# Patient Record
Sex: FEMALE | Race: WHITE | Employment: FULL TIME | URBAN - METROPOLITAN AREA
[De-identification: names, ages, dates, MRNs, and addresses within clinical notes are randomized per-mention and may not be internally consistent; named-entity substitution may affect disease eponyms.]

---

## 2017-05-16 ENCOUNTER — OFFICE VISIT (OUTPATIENT)
Dept: FAMILY MEDICINE CLINIC | Age: 37
End: 2017-05-16

## 2017-05-16 VITALS
WEIGHT: 172 LBS | DIASTOLIC BLOOD PRESSURE: 82 MMHG | RESPIRATION RATE: 18 BRPM | BODY MASS INDEX: 31.65 KG/M2 | SYSTOLIC BLOOD PRESSURE: 120 MMHG | HEIGHT: 62 IN | HEART RATE: 78 BPM | TEMPERATURE: 98.2 F | OXYGEN SATURATION: 100 %

## 2017-05-16 DIAGNOSIS — F41.9 ANXIETY AND DEPRESSION: Primary | ICD-10-CM

## 2017-05-16 DIAGNOSIS — F90.9 ATTENTION DEFICIT HYPERACTIVITY DISORDER (ADHD), UNSPECIFIED ADHD TYPE: ICD-10-CM

## 2017-05-16 DIAGNOSIS — Z30.41 ENCOUNTER FOR BIRTH CONTROL PILLS MAINTENANCE: ICD-10-CM

## 2017-05-16 DIAGNOSIS — F32.A ANXIETY AND DEPRESSION: Primary | ICD-10-CM

## 2017-05-16 DIAGNOSIS — N93.9 VAGINAL BLEEDING: ICD-10-CM

## 2017-05-16 RX ORDER — FLUOXETINE 10 MG/1
20 CAPSULE ORAL DAILY
COMMUNITY
End: 2017-10-04

## 2017-05-16 RX ORDER — NORTRIPTYLINE HYDROCHLORIDE 25 MG/1
CAPSULE ORAL
COMMUNITY
Start: 2017-05-03 | End: 2017-07-20

## 2017-05-16 NOTE — PROGRESS NOTES
Patient presents to clinic for routine follow up. Advance Directive:    1. Do you have an advance directive in place? Patient Reply:     2. If not, would you like material regarding how to put one in place? Patient Reply:      Coordination of Care:    1. Have you been to the ER, urgent care clinic since your last visit? Hospitalized since your last visit? No     2. Have you seen or consulted any other health care providers outside of the [address] [place] [address] since your last visit? Include any pap smears or colon screening. [address] [place] [address]     Depression Screening completed. Learning Assessment completed. Abuse Screening completed. Health Maintenance reviewed and discussed per provider.

## 2017-05-16 NOTE — MR AVS SNAPSHOT
Visit Information Date & Time Provider Department Dept. Phone Encounter #  
 5/16/2017  8:30 AM Enedelia Mak[address]  [id number] Follow-up Instructions Return as needed. Upcoming Health Maintenance Date Due DTaP/Tdap/Td series (1 - Tdap) 8/4/2001 INFLUENZA AGE 9 TO ADULT 8/1/2017 PAP AKA CERVICAL CYTOLOGY 7/27/2018 Allergies as of 5/16/2017  Review Complete On: 5/16/2017 By: Enedelia Mak MD  
  
 Severity Noted Reaction Type Reactions Pcn [Penicillins]  10/17/2016    Rash Current Immunizations  Never Reviewed No immunizations on file. Not reviewed this visit You Were Diagnosed With   
  
 Codes Comments Anxiety and depression    -  Primary ICD-10-CM: F41.9, F32.9 ICD-9-CM: 300.00, 311 Attention deficit hyperactivity disorder (ADHD), unspecified ADHD type     ICD-10-CM: F90.9 ICD-9-CM: 314.01 Vaginal bleeding     ICD-10-CM: N93.9 ICD-9-CM: 623.8 Encounter for birth control pills maintenance     ICD-10-CM: Z30.41 ICD-9-CM: V25.41 Vitals BP Pulse Temp Resp Height(growth percentile) Weight(growth percentile) 120/82 (BP 1 Location: Right arm, BP Patient Position: Sitting) 78 98.2 °F (36.8 °C) (Oral) 18 5' 2\" (1.575 m) 172 lb (78 kg) SpO2 BMI OB Status Smoking Status 100% 31.46 kg/m2 Unknown Never Smoker BMI and BSA Data Body Mass Index Body Surface Area  
 31.46 kg/m 2 1.85 m 2 Preferred Pharmacy Pharmacy Name Phone [facility] - [address], [address] [phone] Your Updated Medication List  
  
   
This list is accurate as of: 5/16/17  9:10 AM.  Always use your most recent med list.  
  
  
  
  
 * amphetamine-dextroamphetamine XR 20 mg XR capsule Commonly known as:  ADDERALL XR Take 1 Cap by mouth every morning. Max Daily Amount: 20 mg.  
  
 * dextroamphetamine-amphetamine 10 mg tablet Commonly known as:  ADDERALL Take 1 Tab by mouth daily. Max Daily Amount: 10 mg. At midday MONONESSA (28) 0.25-35 mg-mcg Tab Generic drug:  norgestimate-ethinyl estradiol Take 1 Tab by mouth daily. nortriptyline 25 mg capsule Commonly known as:  PAMELOR  
  
 PROzac 10 mg capsule Generic drug:  FLUoxetine Take  by mouth daily. * Notice: This list has 2 medication(s) that are the same as other medications prescribed for you. Read the directions carefully, and ask your doctor or other care provider to review them with you. Prescriptions Sent to Pharmacy Refills MONONESSA, 28, 0.25-35 mg-mcg tab 0 Sig: Take 1 Tab by mouth daily. Class: Normal  
 Pharmacy: [address] [place] [address], [address] Ph #: [phone] Route: Oral  
  
We Performed the Following REFERRAL TO GYNECOLOGY [REF30 Custom] Comments:  
 36/F s/p  2 months ago with continuous vaginal bleeding REFERRAL TO PSYCHIATRY [REF91 Custom] Comments:  
 36/F with ADHD, anxiety and depression for further evaluation and management. Follow-up Instructions Return as needed. To-Do List   
 2017 Imaging:  US TRANSVAGINAL Referral Information Referral ID Referred By Referred To  
  
 [id number] Dot Umaña Not Available Visits Status Start Date End Date 1 New Request 17 If your referral has a status of pending review or denied, additional information will be sent to support the outcome of this decision. Referral ID Referred By Referred To  
 [id number] Dot Umaña Not Available Visits Status Start Date End Date 1 New Request 17 If your referral has a status of pending review or denied, additional information will be sent to support the outcome of this decision. Introducing [facility]!    
 Rekha Wu introduces [employer] patient portal. Now you can access parts of your medical record, email your doctor's office, and request medication refills online. 1. In your internet browser, go to [email or web address] 2. Click on the First Time User? Click Here link in the Sign In box. You will see the New Member Sign Up page. 3. Enter your [employer] Access Code exactly as it appears below. You will not need to use this code after youve completed the sign-up process. If you do not sign up before the expiration date, you must request a new code. · [employer] Access Code: [email or web address] Expires: 8/14/2017  9:05 AM 
 
4. Enter the last four digits of your Social Security Number (xxxx) and Date of Birth (mm/dd/yyyy) as indicated and click Submit. You will be taken to the next sign-up page. 5. Create a [employer] ID. This will be your [employer] login ID and cannot be changed, so think of one that is secure and easy to remember. 6. Create a [employer] password. You can change your password at any time. 7. Enter your Password Reset Question and Answer. This can be used at a later time if you forget your password. 8. Enter your e-mail address. You will receive e-mail notification when new information is available in [address] 9. Click Sign Up. You can now view and download portions of your medical record. 10. Click the Download Summary menu link to download a portable copy of your medical information. If you have questions, please visit the Frequently Asked Questions section of the [employer] website. Remember, [employer] is NOT to be used for urgent needs. For medical emergencies, dial 911. Now available from your iPhone and Android! Please provide this summary of care documentation to your next provider. Your primary care clinician is listed as Ck Morel. If you have any questions after today's visit, please call [phone].

## 2017-05-16 NOTE — PROGRESS NOTES
History of Present Illness  Nidia Montoya is a 39 y.o. female who presents today for management of    Chief Complaint   Patient presents with    Behavioral Problem    Anxiety    Depression       Patient had an  2 months ago at [facility]. She had spotting for several weeks. She now has vaginal bleeding, moderate flow. She is now taking OCP. She is not sexually active currently. She sees a psychologist at the [place]. She was switched to Prozac and nortriptyline. Her adderall was discontinued but her school grades have all gone down. She does not see a therapist. She finished a group therapy CBT and states that it does more harm than good. Problem List  Patient Active Problem List    Diagnosis Date Noted    Attention deficit hyperactivity disorder (ADHD) 2017    Mild single current episode of major depressive disorder ([facility] [address] 2016       Past Medical History  Past Medical History:   Diagnosis Date    Anxiety     Depression     PTSD (post-traumatic stress disorder)     Sepsis ([facility] [address]         Surgical History  Past Surgical History:   Procedure Laterality Date    BREAST SURGERY PROCEDURE UNLISTED      implants    HX LAP CHOLECYSTECTOMY  2016    HX ORTHOPAEDIC  2005        Current Medications  Current Outpatient Prescriptions   Medication Sig    FLUoxetine (PROZAC) 10 mg capsule Take  by mouth daily.  MONONESSA, 28, 0.25-35 mg-mcg tab Take 1 Tab by mouth daily.  nortriptyline (PAMELOR) 25 mg capsule     amphetamine-dextroamphetamine XR (ADDERALL XR) 20 mg XR capsule Take 1 Cap by mouth every morning. Max Daily Amount: 20 mg.    dextroamphetamine-amphetamine (ADDERALL) 10 mg tablet Take 1 Tab by mouth daily. Max Daily Amount: 10 mg. At midday     No current facility-administered medications for this visit.         Allergies/Drug Reactions  Allergies   Allergen Reactions    Pcn [Penicillins] Rash        Family History  Family History   Problem Relation Age of Onset    Hypertension Mother     Hypertension Father     Parkinson's Disease Maternal Grandfather         Social History  Social History     Social History    Marital status: SINGLE     Spouse name: N/A    Number of children: N/A    Years of education: N/A     Occupational History    Not on file. Social History Main Topics    Smoking status: Never Smoker    Smokeless tobacco: Never Used    Alcohol use Yes      Comment: social    Drug use: No    Sexual activity: Not Currently     Partners: Male     Birth control/ protection: Pill     Other Topics Concern    Not on file     Social History Narrative       Review of Systems  General ROS: negative for - chills, fatigue or fever  Psychological ROS: positive for - anxiety, concentration difficulties and depression  negative for - hallucinations  Gastrointestinal ROS: no abdominal pain, change in bowel habits, or black or bloody stools  Genito-Urinary ROS: negative for - genital discharge or urinary frequency/urgency, positive for vaginal bleeding      Physical Exam  Vital signs:   Vitals:    17 0842   BP: 120/82   Pulse: 78   Resp: 18   Temp: 98.2 °F (36.8 °C)   TempSrc: Oral   SpO2: 100%   Weight: 172 lb (78 kg)   Height: 5' 2\" (1.575 m)       General: alert, oriented, not in distress  Abdomen: soft, non-distended, non-tender, normal bowel sounds, no organomegaly, no masses  Extremities: no focal deformities, no edema      Assessment/Plan:      1. Anxiety and depression  - continue current meds, will defer management to psych  - REFERRAL TO PSYCHIATRY    2. Attention deficit hyperactivity disorder (ADHD), unspecified ADHD type  - currently off meds, will defer management to psych  - REFERRAL TO PSYCHIATRY    3. Vaginal bleeding  - s/p  2 months ago  - [address]; Future  - referral to gynecology placed    Follow-up Disposition:  Return as needed.       I have discussed the diagnosis with the patient and the intended plan as seen in the above orders. The patient has received an after-visit summary and questions were answered concerning future plans. I have discussed medication side effects and warnings with the patient as well. I have reviewed the plan of care with the patient, accepted their input and they are in agreement with the treatment goals.        Enedelia Mak MD  May 16, 2017

## 2017-07-20 ENCOUNTER — HOSPITAL ENCOUNTER (OUTPATIENT)
Dept: LAB | Age: 37
Discharge: HOME | End: 2017-07-20
Payer: OTHER GOVERNMENT

## 2017-07-20 ENCOUNTER — OFFICE VISIT (OUTPATIENT)
Dept: OBGYN CLINIC | Age: 37
End: 2017-07-20

## 2017-07-20 VITALS
HEIGHT: 62 IN | BODY MASS INDEX: 33.49 KG/M2 | DIASTOLIC BLOOD PRESSURE: 107 MMHG | HEART RATE: 83 BPM | SYSTOLIC BLOOD PRESSURE: 147 MMHG | WEIGHT: 182 LBS

## 2017-07-20 DIAGNOSIS — N93.9 ABNORMAL UTERINE BLEEDING (AUB): ICD-10-CM

## 2017-07-20 DIAGNOSIS — Z01.419 WELL WOMAN EXAM WITH ROUTINE GYNECOLOGICAL EXAM: Primary | ICD-10-CM

## 2017-07-20 LAB
ERYTHROCYTE [DISTWIDTH] IN BLOOD BY AUTOMATED COUNT: 12.2 % (ref 11.6–14.5)
HCT VFR BLD AUTO: 41.7 % (ref 35–45)
HGB BLD-MCNC: 14.1 G/DL (ref 12–16)
MCH RBC QN AUTO: 31 PG (ref 24–34)
MCHC RBC AUTO-ENTMCNC: 33.8 G/DL (ref 31–37)
MCV RBC AUTO: 91.6 FL (ref 74–97)
PLATELET # BLD AUTO: 224 K/UL (ref 135–420)
PMV BLD AUTO: 10.8 FL (ref 9.2–11.8)
RBC # BLD AUTO: 4.55 M/UL (ref 4.2–5.3)
T4 FREE SERPL-MCNC: 1.2 NG/DL (ref 0.7–1.5)
TSH SERPL DL<=0.05 MIU/L-ACNC: 1.34 UIU/ML (ref 0.36–3.74)
WBC # BLD AUTO: 7.1 K/UL (ref 4.6–13.2)

## 2017-07-20 PROCEDURE — 88175 CYTOPATH C/V AUTO FLUID REDO: CPT | Performed by: OBSTETRICS & GYNECOLOGY

## 2017-07-20 PROCEDURE — 87491 CHLMYD TRACH DNA AMP PROBE: CPT | Performed by: OBSTETRICS & GYNECOLOGY

## 2017-07-20 PROCEDURE — 87624 HPV HI-RISK TYP POOLED RSLT: CPT | Performed by: OBSTETRICS & GYNECOLOGY

## 2017-07-20 RX ORDER — NORETHINDRONE ACETATE AND ETHINYL ESTRADIOL 1MG-20(21)
1 KIT ORAL DAILY
Qty: 1 PACKAGE | Refills: 11 | Status: SHIPPED | OUTPATIENT
Start: 2017-07-20 | End: 2017-07-20 | Stop reason: SDUPTHER

## 2017-07-20 RX ORDER — NORETHINDRONE ACETATE AND ETHINYL ESTRADIOL AND FERROUS FUMARATE 1MG-20(21)
KIT ORAL
Qty: 84 TAB | Refills: 11 | Status: SHIPPED | OUTPATIENT
Start: 2017-07-20 | End: 2017-10-04 | Stop reason: ALTCHOICE

## 2017-07-20 NOTE — PROGRESS NOTES
Subjective:   39 y.o. female for annual routine Pap and checkup. Patient's last menstrual period was 2017. Last pap smear:  2 years ago NILM  Menstrual history: usually regular, however, has been irregular since  in 2017 despite compliance with TERESA prescribed by PCP. Dysmenorrhea moderate  H/o STIs:  CT   Social History: single partner, contraception - OCP (Oral Contraceptive Pills). [unfilled]  OB History    Para Term  AB Living   5 3   1 3   SAB TAB Ectopic Molar Multiple Live Births     1         # Outcome Date GA Lbr Bryon/2nd Weight Sex Delivery Anes PTL Lv   5             4 Ectopic            3 Para            2 Para            1 Para                   Pertinent past medical hstory: no history of HTN, DVT, CAD, DM, liver disease, migraines or smoking. Patient Active Problem List   Diagnosis Code    Mild single current episode of major depressive disorder ([facility] [address] F32.0    Attention deficit hyperactivity disorder (ADHD) F90.9     Patient Active Problem List    Diagnosis Date Noted    Attention deficit hyperactivity disorder (ADHD) 2017    Mild single current episode of major depressive disorder ([facility] [address] 2016     Current Outpatient Prescriptions   Medication Sig Dispense Refill    norethindrone-ethinyl estradiol (JUNEL FE ) 1 mg-20 mcg (21)/75 mg (7) tab Take 1 Tab by mouth daily. 1 Package 11    FLUoxetine (PROZAC) 10 mg capsule Take 20 mg by mouth daily.        Allergies   Allergen Reactions    Pcn [Penicillins] Rash     Past Medical History:   Diagnosis Date    Anxiety     Depression     PTSD (post-traumatic stress disorder)     Sepsis ([facility] [address]      Past Surgical History:   Procedure Laterality Date    BREAST SURGERY PROCEDURE UNLISTED      implants    HX LAP CHOLECYSTECTOMY  2016    HX ORTHOPAEDIC  2005     Family History   Problem Relation Age of Onset    Hypertension Mother     Hypertension Father     Parkinson's Disease Maternal Grandfather      Social History   Substance Use Topics    Smoking status: Never Smoker    Smokeless tobacco: Never Used    Alcohol use Yes      Comment: social        ROS:  Feeling well. No dyspnea or chest pain on exertion. No abdominal pain, change in bowel habits, black or bloody stools. No urinary tract symptoms. GYN ROS: irregular menses, no pelvic pain or discharge, no breast pain or new or enlarging lumps on self exam. No neurological complaints. Objective:     Visit Vitals    BP (!) 147/107    Pulse 83    Ht 5' 2\" (1.575 m)    Wt 182 lb (82.6 kg)    LMP 07/04/2017    BMI 33.29 kg/m2     The patient appears well, alert, oriented x 3, in no distress. ENT normal.  Neck supple. No adenopathy or thyromegaly. BEKA. Lungs are clear, good air entry, no wheezes, rhonchi or rales. S1 and S2 normal, no murmurs, regular rate and rhythm. Abdomen soft without tenderness, guarding, mass or organomegaly. Extremities show no edema, normal peripheral pulses. Neurological is normal, no focal findings. BREAST EXAM: right breast normal without mass, skin or nipple changes or axillary nodes, left breast normal without mass, skin or nipple changes or axillary nodes    PELVIC EXAM: VULVA: normal appearing vulva with no masses, tenderness or lesions, VAGINA: normal appearing vagina with normal color and discharge, no lesions, CERVIX: normal appearing cervix without discharge or lesions, UTERUS: uterus is 12 week size, normal shape, consistency and nontender, ADNEXA: normal adnexa in size, nontender and no masses, PAP: Pap smear done today, DNA probe for chlamydia and GC obtained    Assessment/Plan:   well woman  pap smear  counseled on breast self exam, use and side effects of OCP's and family planning choices  additional lab tests per orders  return  In 3 months to follow up on AUB and OCP switch. ICD-10-CM ICD-9-CM    1.  Well woman exam with routine gynecological exam Z01.419 V72.31 PAP IG, CT-NG TV HPV 16&18,45 ([id number], [id number])   2. Abnormal uterine bleeding (AUB) N93.9 626.9 AMB POC URINE PREGNANCY TEST, VISUAL COLOR COMPARISON      TSH AND FREE T4      PROLACTIN      CBC W/O DIFF      US PELV NON OB W TV      norethindrone-ethinyl estradiol (JUNEL FE 1/20) 1 mg-20 mcg (21)/75 mg (7) tab   . Differential diagnosis of abnormal uterine bleeding discussed with patient. Structural causes can include polyps, leiomyoma, adenomyosis or malignancy. Non-structural causes can be a coagulopathy, ovulatory, endocrine or iatrogenic. Management options include medical and surgical management. Medical management includes hormonal contraception, such as OCPs or IUD or contraceptive shot, vaginal ring, or NSAIDs, or tranexamic acid if having regular menses. Surgical management includes endometrial ablation, uterine artery embolization if fibroids are present or definitive treatment with hysterectomy. Patient elects to OCPs. Discussed common SE including irregular bleeding, breast tenderness, nausea, HA. Discussed importance of compliance and what to do if doses skipped. Patient to start OCPs on Sunday after her next menstrual cycle. Patient to use backup contracetion within the first 7 days of OCP initiation. Denies any history of VTE, migraines with aura, HTN. All questions answered. Discussed with patient that our office will call for abnormal lab results. Normal results can be reviewed through [employer]. If patient has not received a phone call from our office or there are no results found on [employer], the patient has been instructed to call our office to follow up on results. I have verbalized the plan of care with patient and the patient expressed understanding.    All questions were answered

## 2017-07-20 NOTE — PATIENT INSTRUCTIONS

## 2017-07-21 LAB
C TRACH RRNA SPEC QL NAA+PROBE: NEGATIVE
N GONORRHOEA RRNA SPEC QL NAA+PROBE: NEGATIVE
SPECIMEN SOURCE: NORMAL
T VAGINALIS RRNA SPEC QL NAA+PROBE: NEGATIVE

## 2017-07-24 LAB — PROLACTIN SERPL-MCNC: 6.3 NG/ML

## 2017-07-25 NOTE — PROGRESS NOTES
Pap NILM. HPV neg  Repeat pap in 5 years or as clinically indicated. JOMAR Zamudio to send letter to patient with above recommendation.

## 2017-07-28 ENCOUNTER — HOSPITAL ENCOUNTER (OUTPATIENT)
Dept: ULTRASOUND IMAGING | Age: 37
Discharge: HOME | End: 2017-07-28
Attending: OBSTETRICS & GYNECOLOGY
Payer: OTHER GOVERNMENT

## 2017-07-28 DIAGNOSIS — N93.9 ABNORMAL UTERINE BLEEDING (AUB): ICD-10-CM

## 2017-07-28 PROCEDURE — 76830 TRANSVAGINAL US NON-OB: CPT

## 2017-07-31 DIAGNOSIS — N83.201 CYST OF RIGHT OVARY: Primary | ICD-10-CM

## 2017-07-31 NOTE — PROGRESS NOTES
Small right ovarian cyst noted. Repeat US ordered for 8 weeks. Juaquin Osborne MA to call patient to notify her.

## 2017-09-28 ENCOUNTER — HOSPITAL ENCOUNTER (OUTPATIENT)
Dept: ULTRASOUND IMAGING | Age: 37
Discharge: HOME | End: 2017-09-28
Attending: OBSTETRICS & GYNECOLOGY
Payer: OTHER GOVERNMENT

## 2017-09-28 DIAGNOSIS — N83.201 CYST OF RIGHT OVARY: ICD-10-CM

## 2017-09-28 PROCEDURE — 76830 TRANSVAGINAL US NON-OB: CPT

## 2017-09-29 ENCOUNTER — TELEPHONE (OUTPATIENT)
Dept: OBGYN CLINIC | Age: 37
End: 2017-09-29

## 2017-09-29 NOTE — PROGRESS NOTES
Right ovarian cyst resolved. Benign left ovarian cyst noted 5 cm. No follow up necessary unless clinically indicated. JOMAR Zamudio to call patient.

## 2017-09-29 NOTE — TELEPHONE ENCOUNTER
----- Message from Maurice Ye DO sent at 9/29/2017  2:55 PM EDT -----  Right ovarian cyst resolved. Benign left ovarian cyst noted 5 cm. No follow up necessary unless clinically indicated. JOMAR Zamudio to call patient.

## 2017-10-04 ENCOUNTER — OFFICE VISIT (OUTPATIENT)
Dept: OBGYN CLINIC | Age: 37
End: 2017-10-04

## 2017-10-04 VITALS
DIASTOLIC BLOOD PRESSURE: 101 MMHG | HEART RATE: 83 BPM | BODY MASS INDEX: 33.49 KG/M2 | WEIGHT: 182 LBS | SYSTOLIC BLOOD PRESSURE: 134 MMHG | HEIGHT: 62 IN

## 2017-10-04 DIAGNOSIS — N92.1 BREAKTHROUGH BLEEDING ON BIRTH CONTROL PILLS: ICD-10-CM

## 2017-10-04 DIAGNOSIS — N92.1 BREAKTHROUGH BLEEDING ON BIRTH CONTROL PILLS: Primary | ICD-10-CM

## 2017-10-04 DIAGNOSIS — N83.202 LEFT OVARIAN CYST: ICD-10-CM

## 2017-10-04 RX ORDER — BUPROPION HYDROCHLORIDE 150 MG/1
TABLET ORAL
Refills: 0 | COMMUNITY
Start: 2017-09-14

## 2017-10-04 RX ORDER — NORTRIPTYLINE HYDROCHLORIDE 25 MG/1
CAPSULE ORAL
COMMUNITY
Start: 2017-06-29

## 2017-10-04 RX ORDER — TOPIRAMATE 25 MG/1
TABLET ORAL
Refills: 0 | COMMUNITY
Start: 2017-09-14

## 2017-10-04 RX ORDER — NORGESTIMATE AND ETHINYL ESTRADIOL 0.25-0.035
1 KIT ORAL DAILY
Qty: 1 PACKAGE | Refills: 6 | Status: SHIPPED | OUTPATIENT
Start: 2017-10-04 | End: 2017-10-04 | Stop reason: SDUPTHER

## 2017-10-04 NOTE — PROGRESS NOTES
Subjective:      Nelson Jamil is a 40 y.o. female who complains of BTB on OCPs since  in 2017    Menstrual history:  She has been bleeding irregularly, occurs every 2 weeks and menses are lasting up to 5 days. Dysmenorrhea: none. Cyclic symptoms include none. She denies breast tenderness, bloating and fluid retention    Sexual history: single partner, contraception - OCP (Oral Contraceptive Pills). But last IC 2 months ago  Prior Pap smear:approximate date 2017 and was normal.    OB History      Para Term  AB Living    5 3   1 3    SAB TAB Ectopic Molar Multiple Live Births      1           Patient Active Problem List   Diagnosis Code    Mild single current episode of major depressive disorder ([facility] [address] F32.0    Attention deficit hyperactivity disorder (ADHD) F90.9    Cyst of right ovary N83.201     Current Outpatient Prescriptions   Medication Sig Dispense Refill    norgestimate-ethinyl estradiol (ORTHO-CYCLEN, SPRINTEC) 0.25-35 mg-mcg tab Take 1 Tab by mouth daily.  1 Package 6    buPROPion XL (WELLBUTRIN XL) 150 mg tablet TK 1 T PO  QD  0    nortriptyline (PAMELOR) 25 mg capsule       topiramate (TOPAMAX) 25 mg tablet TK 1 T PO  QD  0     Allergies   Allergen Reactions    Pcn [Penicillins] Rash     Past Medical History:   Diagnosis Date    Anxiety     Depression     PTSD (post-traumatic stress disorder)     Sepsis ([facility] [address]      Past Surgical History:   Procedure Laterality Date    BREAST SURGERY PROCEDURE UNLISTED      implants    HX LAP CHOLECYSTECTOMY  2016    HX ORTHOPAEDIC  2005     Family History   Problem Relation Age of Onset    Hypertension Mother     Hypertension Father     Parkinson's Disease Maternal Grandfather      Social History   Substance Use Topics    Smoking status: Never Smoker    Smokeless tobacco: Never Used    Alcohol use Yes      Comment: social        Review of Systems:   General ROS: negative for fever, chills, malaise  Respiratory ROS: no cough, shortness of breath, or wheezing  Cardiovascular ROS: no chest pain or dyspnea on exertion  Gastrointestinal ROS: no abdominal pain, change in bowel habits, or black or bloody stools, nausea, vomiting  Genito-Urinary ROS: no dysuria, trouble voiding, incontinence or hematuria. No pelvic pain, unusual discharge, vaginal dryness       Objective:     Visit Vitals    BP (!) 134/101    Pulse 83    Ht 5' 2\" (1.575 m)    Wt 182 lb (82.6 kg)    LMP 09/28/2017    BMI 33.29 kg/m2      Physical Exam:   General appearance - alert, well appearing, and in no distress, oriented to person, place, and time    I personally reviewed the images of the ultrasound. I discussed the radiology dictated report with the patient. Uterus: Measures 10 x 5.6 x 4.2 cm. Endometrial stripe normal thickness at 8.4  mm. Overall echogenicity appears normal. No free fluid in the cul-de-sac. Cervix  appears closed.     Right ovary: Measures 2.7 x 2.3 x 1.4 cm. Only venous flow could be documented  with Doppler interrogation. Morphologically normal appearing ovary. No masses or  cysts.     Left ovary: Mildly enlarged measuring 5.8 x 4.5 x 4.2 cm. Normal arterial and  venous wave tracings on Doppler interrogation. The right ovary appears partially  draped around a hypoechoic benign-appearing lesion which has the appearance of a  benign cyst. The cyst measures 5.3 x 3.5 x 5.3 cm.     IMPRESSION  IMPRESSION:  1. The right ovarian cyst is not appreciated today. The right ovary appears  normal.  2. There now is a benign-appearing cyst involving the left ovary. This probably  represents a functional cyst.    Assessment/Plan:       ICD-10-CM ICD-9-CM    1. Breakthrough bleeding on birth control pills N92.1 626.6 norgestimate-ethinyl estradiol (ORTHO-CYCLEN, SPRINTEC) 0.25-35 mg-mcg tab   2.  Left ovarian cyst N83.202 620.2      OCPs switched to higher E2    lab results and schedule of future lab studies reviewed with patient Discussed with patient that our office will call for abnormal lab results. Normal results can be reviewed through [employer]. If patient has not received a phone call from our office or there are no results found on Mychart, the patient has been instructed to call our office to follow up on results. Greater than 50% of the this 25 min visit was spent in counseling and/or coordination of care. I have verbalized the plan of care with patient. The patient was given a full opportunity to ask questions, indicated that her questions had been answered and expressed understanding.

## 2017-10-04 NOTE — PATIENT INSTRUCTIONS
Abnormal Uterine Bleeding: Care Instructions  Your Care Instructions  Abnormal uterine bleeding (AUB) is irregular bleeding from the uterus that is longer or heavier than usual or does not occur at your regular time. Sometimes it is caused by changes in hormone levels. It can also be caused by growths in the uterus, such as fibroids or polyps. Sometimes a cause cannot be found. You may have heavy bleeding when you are not expecting your period. Your doctor may suggest a pregnancy test, if you think you are pregnant. Follow-up care is a key part of your treatment and safety. Be sure to make and go to all appointments, and call your doctor if you are having problems. It's also a good idea to know your test results and keep a list of the medicines you take. How can you care for yourself at home? · Be safe with medicines. Take pain medicines exactly as directed. ¨ If the doctor gave you a prescription medicine for pain, take it as prescribed. ¨ If you are not taking a prescription pain medicine, ask your doctor if you can take an over-the-counter medicine. · You may be low in iron because of blood loss. Eat a balanced diet that is high in iron and vitamin C. Foods rich in iron include red meat, shellfish, eggs, beans, and leafy green vegetables. Talk to your doctor about whether you need to take iron pills or a multivitamin. When should you call for help? Call 911 anytime you think you may need emergency care. For example, call if:  · You passed out (lost consciousness). Call your doctor now or seek immediate medical care if:  · You have sudden, severe pain in your belly or pelvis. · You have severe vaginal bleeding. You are soaking through your usual pads or tampons every hour for 2 or more hours. · You feel dizzy or lightheaded, or you feel like you may faint. Watch closely for changes in your health, and be sure to contact your doctor if:  · You have new belly or pelvic pain.   · You have a fever.  · Your bleeding gets worse or lasts longer than 1 week. · You think you may be pregnant. Where can you learn more? Go to http://roman-celso.info/. Enter T941 in the search box to learn more about \"Abnormal Uterine Bleeding: Care Instructions. \"  Current as of: October 13, 2016  Content Version: 11.3  © 6368-8126 [employer]. Care instructions adapted under license by [employer] (which disclaims liability or warranty for this information). If you have questions about a medical condition or this instruction, always ask your healthcare professional. [place] [address] any warranty or liability for your use of this information.

## 2018-11-05 NOTE — LETTER
7/25/2017 2:21 PM  
 
Ms. Bennye Essex [address] [facility] [phone] [id number] Dear Bennye Essex I have reviewed your results and have found the results listed below to be within normal ranges. Pap Smear: Normal 
 
My recommendations are as follows: Please Repeat  Pap Smear  In five years. Please call if you have any questions [phone] . Sincerely, 
 
 
 
 
 
Eunice Kumar
EMT/paramedic

## 2023-09-22 NOTE — MR AVS SNAPSHOT
Visit Information Date & Time Provider Department Dept. Phone Encounter #  
 10/4/2017  8:15 AM Rusty Aguilar, Marvin Orr [facility] [phone] [id number] Follow-up Instructions Return in about 4 months (around 2/4/2018). Upcoming Health Maintenance Date Due INFLUENZA AGE 9 TO ADULT 8/1/2017 PAP AKA CERVICAL CYTOLOGY 7/20/2020 Allergies as of 10/4/2017  Review Complete On: 10/4/2017 By: Rusty Aguilar DO Severity Noted Reaction Type Reactions Pcn [Penicillins]  10/17/2016    Rash Current Immunizations  Never Reviewed No immunizations on file. Not reviewed this visit You Were Diagnosed With   
  
 Codes Comments Breakthrough bleeding on birth control pills    -  Primary ICD-10-CM: N92.1 ICD-9-CM: 626.6 Left ovarian cyst     ICD-10-CM: Z33.449 ICD-9-CM: 620.2 Vitals BP Pulse Height(growth percentile) Weight(growth percentile) LMP BMI  
 (!) 134/101 83 5' 2\" (1.575 m) 182 lb (82.6 kg) 09/28/2017 33.29 kg/m2 OB Status Smoking Status Having regular periods Never Smoker BMI and BSA Data Body Mass Index Body Surface Area  
 33.29 kg/m 2 1.9 m 2 Preferred Pharmacy Pharmacy Name Phone [facility] [phone] [phone] Your Updated Medication List  
  
   
This list is accurate as of: 10/4/17  8:41 AM.  Always use your most recent med list.  
  
  
  
  
 buPROPion  mg tablet Commonly known as:  WELLBUTRIN XL  
TK 1 T PO  QD  
  
 norgestimate-ethinyl estradiol 0.25-35 mg-mcg Tab Commonly known as:  Alanna Boards Take 1 Tab by mouth daily. nortriptyline 25 mg capsule Commonly known as:  PAMELOR  
  
 topiramate 25 mg tablet Commonly known as:  TOPAMAX TK 1 T PO  QD Prescriptions Sent to Pharmacy Refills norgestimate-ethinyl estradiol ([place]) 0.25-35 mg-mcg tab 6 Sig: Take 1 Tab by mouth daily. Class: Normal  
 Pharmacy: [facility] [address] [place] [address] [address] [facility] #: [phone] Route: Oral  
  
Follow-up Instructions Return in about 4 months (around 2/4/2018). Patient Instructions Abnormal Uterine Bleeding: Care Instructions Your Care Instructions Abnormal uterine bleeding (AUB) is irregular bleeding from the uterus that is longer or heavier than usual or does not occur at your regular time. Sometimes it is caused by changes in hormone levels. It can also be caused by growths in the uterus, such as fibroids or polyps. Sometimes a cause cannot be found. You may have heavy bleeding when you are not expecting your period. Your doctor may suggest a pregnancy test, if you think you are pregnant. Follow-up care is a key part of your treatment and safety. Be sure to make and go to all appointments, and call your doctor if you are having problems. It's also a good idea to know your test results and keep a list of the medicines you take. How can you care for yourself at home? · Be safe with medicines. Take pain medicines exactly as directed. ¨ If the doctor gave you a prescription medicine for pain, take it as prescribed. ¨ If you are not taking a prescription pain medicine, ask your doctor if you can take an over-the-counter medicine. · You may be low in iron because of blood loss. Eat a balanced diet that is high in iron and vitamin C. Foods rich in iron include red meat, shellfish, eggs, beans, and leafy green vegetables. Talk to your doctor about whether you need to take iron pills or a multivitamin. When should you call for help? Call 911 anytime you think you may need emergency care. For example, call if: 
· You passed out (lost consciousness). Call your doctor now or seek immediate medical care if: 
· You have sudden, severe pain in your belly or pelvis. · You have severe vaginal bleeding. You are soaking through your usual pads or tampons every hour for 2 or more hours. · You feel dizzy or lightheaded, or you feel like you may faint. Watch closely for changes in your health, and be sure to contact your doctor if: 
· You have new belly or pelvic pain. · You have a fever. · Your bleeding gets worse or lasts longer than 1 week. · You think you may be pregnant. Where can you learn more? Go to http://roman-celso.info/. Enter Y228 in the search box to learn more about \"Abnormal Uterine Bleeding: Care Instructions. \" Current as of: October 13, 2016 Content Version: 11.3 © 8708-4376 "[employer] Care instructions adapted under license by [employer] (which disclaims liability or warranty for this information). If you have questions about a medical condition or this instruction, always ask your healthcare professional. [place] [address] any warranty or liability for your use of this information. Introducing [facility]! Meghana Cantor introduces [employer] patient portal. Now you can access parts of your medical record, email your doctor's office, and request medication refills online. 1. In your internet browser, go to [email or web address] 2. Click on the First Time User? Click Here link in the Sign In box. You will see the New Member Sign Up page. 3. Enter your Plinga Access Code exactly as it appears below. You will not need to use this code after youve completed the sign-up process. If you do not sign up before the expiration date, you must request a new code. · [employer] Access Code: [email or web address] Expires: 11/21/2017 10:31 AM 
 
4.  Enter the last four digits of your Social Security Number (xxxx) and Date of Birth (mm/dd/yyyy) as indicated and click Submit. You will be taken to the next sign-up page. 5. Create a ClickTale ID. This will be your ClickTale login ID and cannot be changed, so think of one that is secure and easy to remember. 6. Create a ClickTale password. You can change your password at any time. 7. Enter your Password Reset Question and Answer. This can be used at a later time if you forget your password. 8. Enter your e-mail address. You will receive e-mail notification when new information is available in [address] 9. Click Sign Up. You can now view and download portions of your medical record. 10. Click the Download Summary menu link to download a portable copy of your medical information. If you have questions, please visit the Frequently Asked Questions section of the ClickTale website. Remember, ClickTale is NOT to be used for urgent needs. For medical emergencies, dial 911. Now available from your iPhone and Android! Please provide this summary of care documentation to your next provider. Your primary care clinician is listed as Giovanny Villalobos. If you have any questions after today's visit, please call [phone]. Spinal hardware/ Lt ankle hardware